# Patient Record
Sex: MALE | Race: WHITE | NOT HISPANIC OR LATINO | Employment: FULL TIME | URBAN - METROPOLITAN AREA
[De-identification: names, ages, dates, MRNs, and addresses within clinical notes are randomized per-mention and may not be internally consistent; named-entity substitution may affect disease eponyms.]

---

## 2018-07-04 LAB — RAPID GROUP A STREP (OHS): NEGATIVE

## 2018-12-19 LAB
INFLUENZA A ANTIGEN, POC: NEGATIVE
INFLUENZA B ANTIGEN, POC: NEGATIVE
RAPID GROUP A STREP (OHS): NEGATIVE

## 2019-09-23 ENCOUNTER — HISTORICAL (OUTPATIENT)
Dept: ADMINISTRATIVE | Facility: HOSPITAL | Age: 59
End: 2019-09-23

## 2019-09-23 LAB
ABS NEUT (OLG): 4.3 X10(3)/MCL (ref 2.1–9.2)
ALBUMIN SERPL-MCNC: 4.4 GM/DL (ref 3.4–5)
ALBUMIN/GLOB SERPL: 1.91 {RATIO} (ref 1.5–2.5)
ALP SERPL-CCNC: 49 UNIT/L (ref 38–126)
ALT SERPL-CCNC: 23 UNIT/L (ref 7–52)
APPEARANCE, UA: NORMAL
AST SERPL-CCNC: 24 UNIT/L (ref 15–37)
BACTERIA #/AREA URNS AUTO: NORMAL /HPF
BILIRUB SERPL-MCNC: 0.5 MG/DL (ref 0.2–1)
BILIRUB UR QL STRIP: NEGATIVE MG/DL
BILIRUBIN DIRECT+TOT PNL SERPL-MCNC: 0.1 MG/DL (ref 0–0.5)
BILIRUBIN DIRECT+TOT PNL SERPL-MCNC: 0.4 MG/DL
BUN SERPL-MCNC: 18 MG/DL (ref 7–18)
CALCIUM SERPL-MCNC: 9.4 MG/DL (ref 8.5–10)
CHLORIDE SERPL-SCNC: 103 MMOL/L (ref 98–107)
CHOLEST SERPL-MCNC: 181 MG/DL (ref 0–200)
CHOLEST/HDLC SERPL: 3.4 {RATIO}
CO2 SERPL-SCNC: 29 MMOL/L (ref 21–32)
COLOR UR: YELLOW
CREAT SERPL-MCNC: 1.09 MG/DL (ref 0.6–1.3)
ERYTHROCYTE [DISTWIDTH] IN BLOOD BY AUTOMATED COUNT: 12 % (ref 11.5–17)
GLOBULIN SER-MCNC: 2.2 GM/DL (ref 1.2–3)
GLUCOSE (UA): NEGATIVE MG/DL
GLUCOSE SERPL-MCNC: 109 MG/DL (ref 74–106)
HCT VFR BLD AUTO: 45.6 % (ref 42–52)
HDLC SERPL-MCNC: 54 MG/DL (ref 35–60)
HGB BLD-MCNC: 15.7 GM/DL (ref 14–18)
HGB UR QL STRIP: NEGATIVE UNIT/L
KETONES UR QL STRIP: NEGATIVE MG/DL
LDLC SERPL CALC-MCNC: 115 MG/DL (ref 0–129)
LEUKOCYTE ESTERASE UR QL STRIP: NEGATIVE UNIT/L
LYMPHOCYTES # BLD AUTO: 1.1 X10(3)/MCL (ref 0.6–3.4)
LYMPHOCYTES NFR BLD AUTO: 17.2 % (ref 13–40)
MCH RBC QN AUTO: 30.5 PG (ref 27–31.2)
MCHC RBC AUTO-ENTMCNC: 34 GM/DL (ref 32–36)
MCV RBC AUTO: 89 FL (ref 80–94)
MONOCYTES # BLD AUTO: 0.8 X10(3)/MCL (ref 0.1–1.3)
MONOCYTES NFR BLD AUTO: 12.6 % (ref 0.1–24)
NEUTROPHILS NFR BLD AUTO: 70.2 % (ref 47–80)
NITRITE UR QL STRIP.AUTO: NEGATIVE
PH UR STRIP: 7.5 [PH]
PLATELET # BLD AUTO: 182 X10(3)/MCL (ref 130–400)
PMV BLD AUTO: 10 FL (ref 9.4–12.4)
POTASSIUM SERPL-SCNC: 4.5 MMOL/L (ref 3.5–5.1)
PROT SERPL-MCNC: 6.7 GM/DL (ref 6.4–8.2)
PROT UR QL STRIP: NEGATIVE MG/DL
PSA SERPL-MCNC: 0 NG/ML (ref 0–3.5)
RBC # BLD AUTO: 5.14 X10(6)/MCL (ref 4.7–6.1)
RBC #/AREA URNS HPF: NORMAL /HPF
SODIUM SERPL-SCNC: 140 MMOL/L (ref 136–145)
SP GR UR STRIP: 1.01
SQUAMOUS EPITHELIAL, UA: NORMAL /LPF
TRIGL SERPL-MCNC: 111 MG/DL (ref 30–150)
UROBILINOGEN UR STRIP-ACNC: 0.2 MG/DL
VLDLC SERPL CALC-MCNC: 22.2 MG/DL
WBC # SPEC AUTO: 6.2 X10(3)/MCL (ref 4.5–11.5)
WBC #/AREA URNS AUTO: NORMAL /[HPF]

## 2019-09-24 LAB
EST. AVERAGE GLUCOSE BLD GHB EST-MCNC: 100 MG/DL
HBA1C MFR BLD: 5.1 % (ref 4.4–6.4)

## 2020-09-28 ENCOUNTER — HISTORICAL (OUTPATIENT)
Dept: ADMINISTRATIVE | Facility: HOSPITAL | Age: 60
End: 2020-09-28

## 2020-09-28 LAB
ABS NEUT (OLG): 3.6 X10(3)/MCL (ref 2.1–9.2)
ALBUMIN SERPL-MCNC: 4.4 GM/DL (ref 3.4–5)
ALBUMIN/GLOB SERPL: 1.83 {RATIO} (ref 1.5–2.5)
ALP SERPL-CCNC: 64 UNIT/L (ref 38–126)
ALT SERPL-CCNC: 24 UNIT/L (ref 7–52)
APPEARANCE, UA: CLEAR
AST SERPL-CCNC: 21 UNIT/L (ref 15–37)
BACTERIA #/AREA URNS AUTO: ABNORMAL /HPF
BILIRUB SERPL-MCNC: 0.7 MG/DL (ref 0.2–1)
BILIRUB UR QL STRIP: NEGATIVE MG/DL
BILIRUBIN DIRECT+TOT PNL SERPL-MCNC: 0.2 MG/DL (ref 0–0.5)
BILIRUBIN DIRECT+TOT PNL SERPL-MCNC: 0.5 MG/DL
BUN SERPL-MCNC: 18 MG/DL (ref 7–18)
CALCIUM SERPL-MCNC: 9.4 MG/DL (ref 8.5–10.1)
CHLORIDE SERPL-SCNC: 98 MMOL/L (ref 98–107)
CHOLEST SERPL-MCNC: 159 MG/DL (ref 0–200)
CHOLEST/HDLC SERPL: 2.8 {RATIO}
CO2 SERPL-SCNC: 31 MMOL/L (ref 21–32)
COLOR UR: YELLOW
CREAT SERPL-MCNC: 1.21 MG/DL (ref 0.6–1.3)
ERYTHROCYTE [DISTWIDTH] IN BLOOD BY AUTOMATED COUNT: 12.8 % (ref 11.5–17)
EST. AVERAGE GLUCOSE BLD GHB EST-MCNC: 97 MG/DL
GLOBULIN SER-MCNC: 2.4 GM/DL (ref 1.2–3)
GLUCOSE (UA): NEGATIVE MG/DL
GLUCOSE SERPL-MCNC: 107 MG/DL (ref 74–106)
HBA1C MFR BLD: 5 % (ref 4.4–6.4)
HCT VFR BLD AUTO: 46.3 % (ref 42–52)
HDLC SERPL-MCNC: 57 MG/DL (ref 35–60)
HGB BLD-MCNC: 15.7 GM/DL (ref 14–18)
HGB UR QL STRIP: ABNORMAL UNIT/L
KETONES UR QL STRIP: NEGATIVE MG/DL
LDLC SERPL CALC-MCNC: 90 MG/DL (ref 0–129)
LEUKOCYTE ESTERASE UR QL STRIP: NEGATIVE UNIT/L
LYMPHOCYTES # BLD AUTO: 1.1 X10(3)/MCL (ref 0.6–3.4)
LYMPHOCYTES NFR BLD AUTO: 20.8 % (ref 13–40)
MCH RBC QN AUTO: 30 PG (ref 27–31.2)
MCHC RBC AUTO-ENTMCNC: 34 GM/DL (ref 32–36)
MCV RBC AUTO: 88 FL (ref 80–94)
MONOCYTES # BLD AUTO: 0.6 X10(3)/MCL (ref 0.1–1.3)
MONOCYTES NFR BLD AUTO: 11.7 % (ref 0.1–24)
NEUTROPHILS NFR BLD AUTO: 67.5 % (ref 47–80)
NITRITE UR QL STRIP.AUTO: NEGATIVE
PH UR STRIP: 7 [PH]
PLATELET # BLD AUTO: 186 X10(3)/MCL (ref 130–400)
PMV BLD AUTO: 10.2 FL (ref 9.4–12.4)
POTASSIUM SERPL-SCNC: 4 MMOL/L (ref 3.5–5.1)
PROT SERPL-MCNC: 6.8 GM/DL (ref 6.4–8.2)
PROT UR QL STRIP: NEGATIVE MG/DL
PSA SERPL-MCNC: 0 NG/ML (ref 0–3.5)
RBC # BLD AUTO: 5.23 X10(6)/MCL (ref 4.7–6.1)
RBC #/AREA URNS HPF: ABNORMAL /HPF
SODIUM SERPL-SCNC: 140 MMOL/L (ref 136–145)
SP GR UR STRIP: 1.02
SQUAMOUS EPITHELIAL, UA: ABNORMAL /LPF
TRIGL SERPL-MCNC: 110 MG/DL (ref 30–150)
UROBILINOGEN UR STRIP-ACNC: 0.2 MG/DL
VLDLC SERPL CALC-MCNC: 22 MG/DL
WBC # SPEC AUTO: 5.3 X10(3)/MCL (ref 4.5–11.5)
WBC #/AREA URNS AUTO: ABNORMAL /[HPF]

## 2021-10-25 ENCOUNTER — HISTORICAL (OUTPATIENT)
Dept: ADMINISTRATIVE | Facility: HOSPITAL | Age: 61
End: 2021-10-25

## 2021-10-25 LAB
ABS NEUT (OLG): 4.7 X10(3)/MCL (ref 2.1–9.2)
ALBUMIN SERPL-MCNC: 4.6 GM/DL (ref 3.4–5)
ALBUMIN/GLOB SERPL: 1.64 {RATIO} (ref 1.5–2.5)
ALP SERPL-CCNC: 51 UNIT/L (ref 38–126)
ALT SERPL-CCNC: 23 UNIT/L (ref 7–52)
APPEARANCE, UA: CLEAR
AST SERPL-CCNC: 21 UNIT/L (ref 15–37)
BACTERIA #/AREA URNS AUTO: NORMAL /HPF
BILIRUB SERPL-MCNC: 0.6 MG/DL (ref 0.2–1)
BILIRUB UR QL STRIP: NEGATIVE MG/DL
BILIRUBIN DIRECT+TOT PNL SERPL-MCNC: 0.1 MG/DL (ref 0–0.5)
BILIRUBIN DIRECT+TOT PNL SERPL-MCNC: 0.5 MG/DL
BUN SERPL-MCNC: 21 MG/DL (ref 7–18)
CALCIUM SERPL-MCNC: 10.1 MG/DL (ref 8.5–10.1)
CHLORIDE SERPL-SCNC: 102 MMOL/L (ref 98–107)
CHOLEST SERPL-MCNC: 259 MG/DL (ref 0–200)
CHOLEST/HDLC SERPL: 6 {RATIO}
CO2 SERPL-SCNC: 30 MMOL/L (ref 21–32)
COLOR UR: YELLOW
CREAT SERPL-MCNC: 1.19 MG/DL (ref 0.6–1.3)
ERYTHROCYTE [DISTWIDTH] IN BLOOD BY AUTOMATED COUNT: 12.4 % (ref 11.5–17)
EST. AVERAGE GLUCOSE BLD GHB EST-MCNC: 97 MG/DL
GLOBULIN SER-MCNC: 2.8 GM/DL (ref 1.2–3)
GLUCOSE (UA): NEGATIVE MG/DL
GLUCOSE SERPL-MCNC: 111 MG/DL (ref 74–106)
HBA1C MFR BLD: 5 % (ref 4.4–6.4)
HCT VFR BLD AUTO: 47.7 % (ref 42–52)
HDLC SERPL-MCNC: 43 MG/DL (ref 35–60)
HGB BLD-MCNC: 15.9 GM/DL (ref 14–18)
HGB UR QL STRIP: NEGATIVE UNIT/L
KETONES UR QL STRIP: NEGATIVE MG/DL
LDLC SERPL CALC-MCNC: 165 MG/DL (ref 0–129)
LEUKOCYTE ESTERASE UR QL STRIP: NEGATIVE UNIT/L
LYMPHOCYTES # BLD AUTO: 1.2 X10(3)/MCL (ref 0.6–3.4)
LYMPHOCYTES NFR BLD AUTO: 17.6 % (ref 13–40)
MCH RBC QN AUTO: 30.5 PG (ref 27–31.2)
MCHC RBC AUTO-ENTMCNC: 33 GM/DL (ref 32–36)
MCV RBC AUTO: 91 FL (ref 80–94)
MONOCYTES # BLD AUTO: 0.7 X10(3)/MCL (ref 0.1–1.3)
MONOCYTES NFR BLD AUTO: 10.7 % (ref 0.1–24)
NEUTROPHILS NFR BLD AUTO: 71.7 % (ref 47–80)
NITRITE UR QL STRIP.AUTO: NEGATIVE
PH UR STRIP: 5.5 [PH]
PLATELET # BLD AUTO: 219 X10(3)/MCL (ref 130–400)
PMV BLD AUTO: 9.9 FL (ref 9.4–12.4)
POTASSIUM SERPL-SCNC: 4.5 MMOL/L (ref 3.5–5.1)
PROT SERPL-MCNC: 7.4 GM/DL (ref 6.4–8.2)
PROT UR QL STRIP: NEGATIVE MG/DL
PSA SERPL-MCNC: 0 NG/ML (ref 0–4.5)
RBC # BLD AUTO: 5.22 X10(6)/MCL (ref 4.7–6.1)
RBC #/AREA URNS HPF: NORMAL /HPF
SODIUM SERPL-SCNC: 139 MMOL/L (ref 136–145)
SP GR UR STRIP: 1.02
SQUAMOUS EPITHELIAL, UA: NORMAL /LPF
TRIGL SERPL-MCNC: 111 MG/DL (ref 30–150)
UROBILINOGEN UR STRIP-ACNC: 0.2 MG/DL
VLDLC SERPL CALC-MCNC: 22.2 MG/DL
WBC # SPEC AUTO: 6.6 X10(3)/MCL (ref 4.5–11.5)
WBC #/AREA URNS AUTO: NORMAL /[HPF]

## 2022-04-10 ENCOUNTER — HISTORICAL (OUTPATIENT)
Dept: ADMINISTRATIVE | Facility: HOSPITAL | Age: 62
End: 2022-04-10

## 2022-04-24 VITALS
SYSTOLIC BLOOD PRESSURE: 118 MMHG | OXYGEN SATURATION: 97 % | HEIGHT: 74 IN | DIASTOLIC BLOOD PRESSURE: 70 MMHG | BODY MASS INDEX: 30.38 KG/M2 | WEIGHT: 236.75 LBS

## 2022-05-03 NOTE — HISTORICAL [facility]ER
This is a historical note converted from [employer]. Formatting and pictures may have been removed.  Please reference Penny for original formatting and attached multimedia. Chief Complaint  ANNUAL WELLNESS-NPO  History of Present Illness  Pt presents for Wellness exam.  He has been feeling well.  Sleep: he sleeps well with Trazodone.  Appetite is normal; he tends to eat more in the evening.  He exercises few?times a week intermittently.  No tobacco.  He has 2 children and 8 grandchildren.  He has a few drinks monthly.  He has 1 cup of coffee daily.  He is not working at this time, he is now retired.  Colonoscopy 2011: Dr Stevenson, repeat in 10 years.  Uro: Bjorn BOYLE--h/o prostate cancer and is now following PRN.  Review of Systems  GENERAL: No weight loss, no?weight gain, no fever, no fatigue, no chills, no night sweats  HEENT: No sore throat, no ear pain, no sinus pressure, no nasal congestion, no rhinorrhea, no decreased hearing, +seasonal allergies?  VISION: No vision changes, no blurry vision, no double vision, no glaucoma, no cataracts, no glasses, no contacts  LAST EYE EXAM: several years ago  NECK: no LAD  CARDIAC: No chest pain, no palpitations, no dyspnea on exertion, no orthopnea  RESPIRATORY: No cough, no wheezing, no sputum production, no?SOB  GI: No abdominal pain, no N/V, no constipation, no diarrhea, no blood in stool,?( -)?family history of Colon Ca  : No dysuria, no hematuria, no frequency, no urgency, no incontinence, no testicular pain/swelling, (+ ) family history of Prostate Ca--father, grandfather, self  MUSC/SKEL: No myalgia, no weakness, no edema, no arthralgia, no joint swelling/effusions  SKIN: No rashes, no hives, no itching, no sores  NEURO: No HA, no numbness, no tingling, no weakness, no dizziness  PSYCH: No anxiety, no depression, no?irritability, no panic attacks,?no s/i, no h/i, no?hallucinations  ENDO: No polyuria, no polyphagia,? no polydipsia  HEME: No bruising, no bleeding  disorders, no signs of anemia.?  Physical Exam  Vitals & Measurements  T:?36.8? ?C (Oral)? HR:?74(Peripheral)? BP:?128/80? SpO2:?95%?  HT:?182.88?cm? HT:?182.88?cm? WT:?111.000?kg? WT:?111.0?kg? BMI:?33.19?  General: Well developed, well nourished in no apparent distress, alert and oriented x3  Skin:?No rash or abnormal?lesions  HEENT:?Normocephalic, PERRLA, EOMI, mouth WNL, throat WNL, nares normal, EAC and TM WNL bilaterally  Neck:?FROM, no LAD, no thyroid abnormalities?palpable  Chest:?CTA?bilaterally, no wheezes crackles or rubs  Cardiac: RRR,?no murmurs, rubs, gallops  Abdomen: Soft, nontender,?nondistended, NBSx4, no rebound tenderness or guarding, no HSM  Extremities:?No clubbing, cyanosis, or edema.? Joints WNL, +2 DP/PT pulses bilaterally  Neuro: No sensory or motor defects noted. CN II-XII intact. Gait WNL.  Genital: declined--given risks.  Assessment/Plan  1.?Wellness examination?Z00.00  CBC, CMP, Lipids, UA, PSA?ordered today.?  Colonoscopy 2011: Dr Stevenson, repeat in 10 years.  Encourage pt to increase cardiovascular exercise and attempt to obtain at least 150 minutes of moderate aerobic exercise per week or 75 minutes of vigorous aerobic exercise weekly.  Ordered:  CBC w/ Auto Diff, Routine collect, 09/28/20 9:13:00 CDT, Blood, Order for future visit, Stop date 09/28/20 9:13:00 CDT, Lab Collect, Wellness examination  Hypertension  Hypercholesteremia  Depression  Insomnia  History of prostate cancer, 09/28/20 9:13:00 CDT  Clinic Follow-Up Wellness, *Est. 09/28/21 3:00:00 CDT, Order for future visit, Wellness examination  Hypertension  Hypercholesteremia  Depression  Insomnia  History of prostate cancer  Immunization due, [facility]  Comprehensive Metabolic Panel, Routine collect, 09/28/20 9:13:00 CDT, Blood, Order for future visit, Stop date 09/28/20 9:13:00 CDT, Lab Collect, Wellness examination  Hypertension  Hypercholesteremia  Depression  Insomnia  History of prostate cancer, 09/28/20  9:13:00 CDT  Lab Collection Request, 09/28/20 9:13:00 CDT, [facility] AMB - AFP, 09/28/20 9:13:00 CDT, Wellness examination  Hypertension  Hypercholesteremia  Depression  Insomnia  History of prostate cancer  Lipid Panel, Routine collect, 09/28/20 9:13:00 CDT, Blood, Order for future visit, Stop date 09/28/20 9:13:00 CDT, Lab Collect, Wellness examination  Hypercholesteremia  Hypertension, 09/28/20 9:13:00 CDT  Preventative Health Care Est 40-64 years [id number] PC, Wellness examination  Hypertension  Hypercholesteremia  Depression  Insomnia  History of prostate cancer  Immunization due, [facility] AMB - AFP, 09/28/20 9:13:00 CDT  Prostate Specific Antigen, Routine collect, 09/28/20 9:13:00 CDT, Blood, Order for future visit, Stop date 09/28/20 9:13:00 CDT, Lab Collect, Wellness examination  History of prostate cancer, 09/28/20 9:13:00 CDT  Urinalysis no Reflex, Routine collect, Urine, Order for future visit, 09/28/20 9:13:00 CDT, Stop date 09/28/20 9:13:00 CDT, Nurse collect, Wellness examination  Hypertension  ?  2.?Hypertension?I10  Stable and well controlled at this time. Continue current treatment. Limit salt in diet. Monitor BP at home.  Ordered:  CBC w/ Auto Diff, Routine collect, 09/28/20 9:13:00 CDT, Blood, Order for future visit, Stop date 09/28/20 9:13:00 CDT, Lab Collect, Wellness examination  Hypertension  Hypercholesteremia  Depression  Insomnia  History of prostate cancer, 09/28/20 9:13:00 CDT  Clinic Follow-Up Wellness, *Est. 09/28/21 3:00:00 CDT, Order for future visit, Wellness examination  Hypertension  Hypercholesteremia  Depression  Insomnia  History of prostate cancer  Immunization due, [facility]  Comprehensive Metabolic Panel, Routine collect, 09/28/20 9:13:00 CDT, Blood, Order for future visit, Stop date 09/28/20 9:13:00 CDT, Lab Collect, Wellness examination  Hypertension  Hypercholesteremia  Depression  Insomnia  History of prostate cancer, 09/28/20 9:13:00 CDT  Lab  Collection Request, 09/28/20 9:13:00 CDT, [facility] AMB - AFP, 09/28/20 9:13:00 CDT, Wellness examination  Hypertension  Hypercholesteremia  Depression  Insomnia  History of prostate cancer  Lipid Panel, Routine collect, 09/28/20 9:13:00 CDT, Blood, Order for future visit, Stop date 09/28/20 9:13:00 CDT, Lab Collect, Wellness examination  Hypercholesteremia  Hypertension, 09/28/20 9:13:00 CDT  Preventative Health Care Est 40-64 years [id number] PC, Wellness examination  Hypertension  Hypercholesteremia  Depression  Insomnia  History of prostate cancer  Immunization due, [facility] AMB - AFP, 09/28/20 9:13:00 CDT  Urinalysis no Reflex, Routine collect, Urine, Order for future visit, 09/28/20 9:13:00 CDT, Stop date 09/28/20 9:13:00 CDT, Nurse collect, Wellness examination  Hypertension  ?  3.?Hypercholesteremia?E78.00  Continue current medicine. ?Follow low-cholesterol diet with?physical activity.??  Ordered:  CBC w/ Auto Diff, Routine collect, 09/28/20 9:13:00 CDT, Blood, Order for future visit, Stop date 09/28/20 9:13:00 CDT, Lab Collect, Wellness examination  Hypertension  Hypercholesteremia  Depression  Insomnia  History of prostate cancer, 09/28/20 9:13:00 CDT  Clinic Follow-Up Wellness, *Est. 09/28/21 3:00:00 CDT, Order for future visit, Wellness examination  Hypertension  Hypercholesteremia  Depression  Insomnia  History of prostate cancer  Immunization due, [facility]  Comprehensive Metabolic Panel, Routine collect, 09/28/20 9:13:00 CDT, Blood, Order for future visit, Stop date 09/28/20 9:13:00 CDT, Lab Collect, Wellness examination  Hypertension  Hypercholesteremia  Depression  Insomnia  History of prostate cancer, 09/28/20 9:13:00 CDT  Lab Collection Request, 09/28/20 9:13:00 CDT, [facility] AMB - AFP, 09/28/20 9:13:00 CDT, Wellness examination  Hypertension  Hypercholesteremia  Depression  Insomnia  History of prostate cancer  Lipid Panel, Routine collect, 09/28/20 9:13:00 CDT, Blood, Order  for future visit, Stop date 09/28/20 9:13:00 CDT, Lab Collect, Wellness examination  Hypercholesteremia  Hypertension, 09/28/20 9:13:00 CDT  Preventative Health Care Est 40-64 years [id number] PC, Wellness examination  Hypertension  Hypercholesteremia  Depression  Insomnia  History of prostate cancer  Immunization due, [facility] AMB - AFP, 09/28/20 9:13:00 CDT  ?  4.?Depression?F32.9  Stable/well controlled with current treatment. Will continue to monitor? Risks/benefits/side effects of medication?and alternatives discussed with patient in great detail?who voiced clear understanding.? ED precautions per suicidal ideation,?homicidal ideation,?self-harm?and verbalized understanding.? If concerns or issues arise after office hours,?call 911 or report to emergency room; patient verbalized understanding.? Informed patient to not stop medication without informing office and medical staff.?  Ordered:  CBC w/ Auto Diff, Routine collect, 09/28/20 9:13:00 CDT, Blood, Order for future visit, Stop date 09/28/20 9:13:00 CDT, Lab Collect, Wellness examination  Hypertension  Hypercholesteremia  Depression  Insomnia  History of prostate cancer, 09/28/20 9:13:00 CDT  Clinic Follow-Up Wellness, *Est. 09/28/21 3:00:00 CDT, Order for future visit, Wellness examination  Hypertension  Hypercholesteremia  Depression  Insomnia  History of prostate cancer  Immunization due, [facility]  Comprehensive Metabolic Panel, Routine collect, 09/28/20 9:13:00 CDT, Blood, Order for future visit, Stop date 09/28/20 9:13:00 CDT, Lab Collect, Wellness examination  Hypertension  Hypercholesteremia  Depression  Insomnia  History of prostate cancer, 09/28/20 9:13:00 CDT  Lab Collection Request, 09/28/20 9:13:00 CDT, [facility] AMB - AFP, 09/28/20 9:13:00 CDT, Wellness examination  Hypertension  Hypercholesteremia  Depression  Insomnia  History of prostate cancer  Preventative Health Care Est 40-64 years [id number] PC, Wellness examination   Hypertension  Hypercholesteremia  Depression  Insomnia  History of prostate cancer  Immunization due, [facility] AMB - AFP, 09/28/20 9:13:00 CDT  ?  5.?Insomnia?G47.00  Stable/well controlled with current treatment. Will continue to monitor.?  Ordered:  CBC w/ Auto Diff, Routine collect, 09/28/20 9:13:00 CDT, Blood, Order for future visit, Stop date 09/28/20 9:13:00 CDT, Lab Collect, Wellness examination  Hypertension  Hypercholesteremia  Depression  Insomnia  History of prostate cancer, 09/28/20 9:13:00 CDT  Clinic Follow-Up Wellness, *Est. 09/28/21 3:00:00 CDT, Order for future visit, Wellness examination  Hypertension  Hypercholesteremia  Depression  Insomnia  History of prostate cancer  Immunization due, [facility] AFP  Comprehensive Metabolic Panel, Routine collect, 09/28/20 9:13:00 CDT, Blood, Order for future visit, Stop date 09/28/20 9:13:00 CDT, Lab Collect, Wellness examination  Hypertension  Hypercholesteremia  Depression  Insomnia  History of prostate cancer, 09/28/20 9:13:00 CDT  Lab Collection Request, 09/28/20 9:13:00 CDT, [facility] AMB - AFP, 09/28/20 9:13:00 CDT, Wellness examination  Hypertension  Hypercholesteremia  Depression  Insomnia  History of prostate cancer  Preventative Health Care Est 40-64 years [id number] PC, Wellness examination  Hypertension  Hypercholesteremia  Depression  Insomnia  History of prostate cancer  Immunization due, [facility] AMB - AFP, 09/28/20 9:13:00 CDT  ?  6.?History of prostate cancer?Z85.46  S/p prostatectomy 2012; PSA today.  Ordered:  CBC w/ Auto Diff, Routine collect, 09/28/20 9:13:00 CDT, Blood, Order for future visit, Stop date 09/28/20 9:13:00 CDT, Lab Collect, Wellness examination  Hypertension  Hypercholesteremia  Depression  Insomnia  History of prostate cancer, 09/28/20 9:13:00 CDT  Clinic Follow-Up Wellness, *Est. 09/28/21 3:00:00 CDT, Order for future visit, Wellness examination  Hypertension  Hypercholesteremia  Depression  Insomnia   History of prostate cancer  Immunization due, [facility]  Comprehensive Metabolic Panel, Routine collect, 09/28/20 9:13:00 CDT, Blood, Order for future visit, Stop date 09/28/20 9:13:00 CDT, Lab Collect, Wellness examination  Hypertension  Hypercholesteremia  Depression  Insomnia  History of prostate cancer, 09/28/20 9:13:00 CDT  Lab Collection Request, 09/28/20 9:13:00 CDT, [facility] AMB - AFP, 09/28/20 9:13:00 CDT, Wellness examination  Hypertension  Hypercholesteremia  Depression  Insomnia  History of prostate cancer  Preventative Health Care Est 40-64 years [id number] PC, Wellness examination  Hypertension  Hypercholesteremia  Depression  Insomnia  History of prostate cancer  Immunization due, [facility] AMB - AFP, 09/28/20 9:13:00 CDT  Prostate Specific Antigen, Routine collect, 09/28/20 9:13:00 CDT, Blood, Order for future visit, Stop date 09/28/20 9:13:00 CDT, Lab Collect, Wellness examination  History of prostate cancer, 09/28/20 9:13:00 CDT  ?  7.?Immunization due?Z23  Flu 0.5ml admin today--risks/benefits discussed with patient.?  Ordered:  Influenza Virus Vaccine, Inactivated, 0.5 mL, IM, Once, first dose 09/28/20 9:14:00 CDT, stop date 09/28/20 9:14:00 CDT  Clinic Follow-Up Wellness, *Est. 09/28/21 3:00:00 CDT, Order for future visit, Wellness examination  Hypertension  Hypercholesteremia  Depression  Insomnia  History of prostate cancer  Immunization due, [facility] Care Est 40-64 years [id number] PC, Wellness examination  Hypertension  Hypercholesteremia  Depression  Insomnia  History of prostate cancer  Immunization due, [facility] AMB - AFP, 09/28/20 9:13:00 CDT  ?  Orders:  amlodipine-benazepril, 1 cap(s), Oral, Daily, # 90 cap(s), 3 Refill(s)  atorvastatin, 20 mg = 1 tab(s), Oral, Daily, # 90 tab(s), 1 Refill(s)  buPROPion, See Instructions, TAKE 1 TABLET BY MOUTH EVERY DAY, # 90 tab(s), 3 Refill(s)  sildenafil, 20 mg = 1 tab(s), Oral, TID, # 270 tab(s), 1 Refill(s), Weight  Dosing  traZODone, See Instructions, TAKE 1 TABLET BY MOUTH ONCE DAILY IN THE EVENING., # 90 tab(s), 3 Refill(s)  venlafaxine, See Instructions, TAKE 1 CAPSULE BY MOUTH EVERY DAY, # 90 cap(s), 3 Refill(s)  Referrals  Clinic Follow-Up Wellness, *Est. 09/28/21 3:00:00 CDT, Order for future visit, Wellness examination  Hypertension  Hypercholesteremia  Depression  Insomnia  History of prostate cancer  Immunization due, [facility] AFP   Problem List/Past Medical History  Ongoing  Dependent edema  Depression  History of prostate cancer  Hypercholesteremia  Hypertension  Insomnia  Obesity  Historical  Acid reflux  CA - Cancer  Hypertension Diastolic(  Confirmed  )  Leg pain  Sinusitis  Procedure/Surgical History  CLOSURE OF SKIN AND SUBCUTANEOUS TISSUE OTHER SITES (05/05/2013)  Simple repair of superficial wounds of scalp, neck, axillae, external genitalia, trunk and/or extremities (including hands and feet); 2.5 cm or less. (05/05/2013)  Ablation, soft tissue of inferior turbinates, unilateral or bilateral, any method (eg, electrocautery, radiofrequency ablation, or tissue volume reduction); intramural (ie, submucosal). (12/10/2012)  Ethmoidectomy (12/10/2012)  Fracture nasal inferior turbinate(s), therapeutic. (12/10/2012)  Fracture of the turbinates (12/10/2012)  Intranasal antrotomy (12/10/2012)  Nasal/sinus endoscopy, surgical, with maxillary antrostomy;. (12/10/2012)  Nasal/sinus endoscopy, surgical, with sphenoidotomy; with removal of tissue from the sphenoid sinus. (12/10/2012)  Nasal/sinus endoscopy, surgical; with olvin bullosa resection. (12/10/2012)  Nasal/sinus endoscopy, surgical; with ethmoidectomy, total (anterior and posterior). (12/10/2012)  Other septoplasty (12/10/2012)  Other turbinectomy (12/10/2012)  Septoplasty or submucous resection, with or without cartilage scoring, contouring or replacement with graft. (12/10/2012)  Sphenoidectomy (12/10/2012)  Turbinectomy by diathermy or cryosurgery  (12/10/2012)  Laparoscopic robotic assisted procedure (09/26/2012)  Radical prostatectomy (09/26/2012)  Biopsy of prostate (08/09/2012)  Colonoscopy (10/24/2011)  Esophagogastroduodenoscopy (10/24/2011)  Skin graft (1967)   Medications  amlodipine-benazepril 10 mg-40 mg oral capsule, 1 cap(s), Oral, Daily, 3 refills  atorvastatin 20 mg oral tablet, 20 mg= 1 tab(s), Oral, Daily, 1 refills  buPROPion 300 mg/24 hours (XL) oral tablet, extended release, See Instructions, 3 refills  EpiPen Auto-Injector, 0.3 mg, IM, As Directed, PRN  Influenza Virus Vaccine, Inactivated, 0.5 mL, IM, Once  sildenafil 20 mg oral tablet, 20 mg= 1 tab(s), Oral, TID, 1 refills  traZODone 100 mg oral tablet, See Instructions, 3 refills  venlafaxine 150 mg oral capsule, extended release, See Instructions, 3 refills  Allergies  Milk Products?(bloating)  Wheat?(bloating)  Social History  Abuse/Neglect  No, 09/28/2020  No, 09/23/2019  No, 06/14/2019  Alcohol - Low Risk, 12/07/2012  Current, Beer, Liquor, 1-2 times per month, 09/28/2020  Current, Beer, 3-5 times per week, 12/07/2012  Current, Beer, 1-2 times per week, 3 drinks/episode average., 09/24/2012  Employment/School  Retired, 09/28/2020  Employed, Highest education level: University degree(s)., 12/07/2012  Exercise  Home/Environment  Lives with Siblings., 09/28/2020  Nutrition/Health  Regular, Good, 09/28/2020  Substance Use - Denies Substance Abuse, 12/07/2012  Never, 09/28/2020  Tobacco - Denies Tobacco Use, 12/07/2012  Never (less than 100 in lifetime), N/A, 09/28/2020  Never (less than 100 in lifetime), N/A, 09/23/2019  Never (less than 100 in lifetime), N/A, 06/14/2019  Never smoker, N/A, 12/19/2018  Never smoker Use:., 07/04/2018  Family History  Diabetes mellitus: Father.  Hyperlipidemia.: Mother and Father.  Hypertension: Mother, Father and Brother.  Obesity: Mother and Brother.  Immunizations  Vaccine Date Status   influenza virus vaccine, inactivated 09/23/2019 Given   influenza  virus vaccine, inactivated 01/03/2018 Recorded   influenza virus vaccine, inactivated 09/27/2016 Recorded   tetanus/diphtheria/pertussis, acel(Tdap) 09/27/2016 Recorded   influenza virus vaccine, inactivated 10/12/2015 Recorded   tetanus-diphtheria toxoids 05/05/2013 Given   Health Maintenance  Health Maintenance  ???Pending?(in the next year)  ??? ??OverDue  ??? ? ? ?Aspirin Therapy for CVD Prevention due??04/05/13??and every 1??year(s)  ??? ? ? ?Hypertension Maintenance-Medication Prescribed due??12/07/13??and every 1??year(s)  ??? ? ? ?Alcohol Misuse Screening due??01/02/20??and every 1??year(s)  ??? ? ? ?Hypertension Management-BMP due??09/22/20??and every 1??year(s)  ??? ??Due?  ??? ? ? ?ADL Screening due??09/28/20??and every 1??year(s)  ??? ? ? ?Hypertension Management-Education due??09/28/20??and every 1??year(s)  ??? ? ? ?Influenza Vaccine due??09/28/20??and every?  ??? ? ? ?Zoster Vaccine due??09/28/20??and every?  ??? ??Due In Future?  ??? ? ? ?Obesity Screening not due until??01/01/21??and every 1??year(s)  ???Satisfied?(in the past 1 year)  ??? ??Satisfied?  ??? ? ? ?Blood Pressure Screening on??09/28/20.??Satisfied by Levi Artis LPN  ??? ? ? ?Body Mass Index Check on??09/28/20.??Satisfied by Levi Artis LPN  ??? ? ? ?Hypertension Management-Blood Pressure on??09/28/20.??Satisfied by Levi Artis LPN  ??? ? ? ?Influenza Vaccine on??09/28/20.??Satisfied by Rosalinda Carmen NP  ??? ? ? ?Obesity Screening on??09/28/20.??Satisfied by Levi Artis LPN  ?      Patients condition discussed the development nurse practitioner.?  I have reviewed and agree with plan of care and follow-up.

## 2022-09-21 ENCOUNTER — HISTORICAL (OUTPATIENT)
Dept: ADMINISTRATIVE | Facility: HOSPITAL | Age: 62
End: 2022-09-21

## 2022-09-21 PROBLEM — I10 HYPERTENSION: Status: ACTIVE | Noted: 2022-09-21

## 2022-09-21 PROBLEM — F32.A DEPRESSION: Status: ACTIVE | Noted: 2022-09-21

## 2022-09-21 PROBLEM — E78.00 HYPERCHOLESTEREMIA: Status: ACTIVE | Noted: 2022-09-21

## 2022-09-21 PROBLEM — G47.00 INSOMNIA: Status: ACTIVE | Noted: 2022-09-21

## 2022-09-21 PROBLEM — K21.9 ACID REFLUX: Status: ACTIVE | Noted: 2022-09-21

## 2022-09-21 PROBLEM — C61 PROSTATE CANCER: Status: ACTIVE | Noted: 2022-09-21

## 2022-09-21 PROBLEM — E16.2 HYPOGLYCEMIA: Status: ACTIVE | Noted: 2022-09-21

## 2022-09-21 PROBLEM — J32.9 SINUSITIS: Status: ACTIVE | Noted: 2022-09-21

## 2022-10-02 PROBLEM — Z00.00 ENCOUNTER FOR WELLNESS EXAMINATION IN ADULT: Status: ACTIVE | Noted: 2022-10-02

## 2022-10-04 PROBLEM — Z12.11 COLON CANCER SCREENING: Status: ACTIVE | Noted: 2022-10-04

## 2022-10-04 PROBLEM — Z23 IMMUNIZATION DUE: Status: ACTIVE | Noted: 2022-10-04

## 2022-12-01 DIAGNOSIS — K21.9 GASTROESOPHAGEAL REFLUX DISEASE WITHOUT ESOPHAGITIS: Primary | ICD-10-CM

## 2023-01-02 PROBLEM — Z00.00 ENCOUNTER FOR WELLNESS EXAMINATION IN ADULT: Status: RESOLVED | Noted: 2022-10-02 | Resolved: 2023-01-02

## 2023-10-17 PROBLEM — Z00.00 WELLNESS EXAMINATION: Status: ACTIVE | Noted: 2022-10-04

## 2023-12-23 ENCOUNTER — OFFICE VISIT (OUTPATIENT)
Dept: URGENT CARE | Facility: CLINIC | Age: 63
End: 2023-12-23
Payer: COMMERCIAL

## 2023-12-23 VITALS
OXYGEN SATURATION: 95 % | HEIGHT: 74 IN | WEIGHT: 240 LBS | TEMPERATURE: 98 F | RESPIRATION RATE: 20 BRPM | SYSTOLIC BLOOD PRESSURE: 151 MMHG | HEART RATE: 80 BPM | BODY MASS INDEX: 30.8 KG/M2 | DIASTOLIC BLOOD PRESSURE: 95 MMHG

## 2023-12-23 DIAGNOSIS — J00 NASOPHARYNGITIS: Primary | ICD-10-CM

## 2023-12-23 LAB
CTP QC/QA: YES
CTP QC/QA: YES
POC MOLECULAR INFLUENZA A AGN: NEGATIVE
POC MOLECULAR INFLUENZA B AGN: NEGATIVE
SARS-COV-2 RDRP RESP QL NAA+PROBE: NEGATIVE

## 2023-12-23 PROCEDURE — 87635: ICD-10-PCS | Mod: QW,,, | Performed by: FAMILY MEDICINE

## 2023-12-23 PROCEDURE — 87502 INFLUENZA DNA AMP PROBE: CPT | Mod: QW,,, | Performed by: FAMILY MEDICINE

## 2023-12-23 PROCEDURE — 87502 POCT INFLUENZA A/B MOLECULAR: ICD-10-PCS | Mod: QW,,, | Performed by: FAMILY MEDICINE

## 2023-12-23 PROCEDURE — 99213 PR OFFICE/OUTPT VISIT, EST, LEVL III, 20-29 MIN: ICD-10-PCS | Mod: ,,, | Performed by: FAMILY MEDICINE

## 2023-12-23 PROCEDURE — 99213 OFFICE O/P EST LOW 20 MIN: CPT | Mod: ,,, | Performed by: FAMILY MEDICINE

## 2023-12-23 PROCEDURE — 87635 SARS-COV-2 COVID-19 AMP PRB: CPT | Mod: QW,,, | Performed by: FAMILY MEDICINE

## 2023-12-23 RX ORDER — PROMETHAZINE HYDROCHLORIDE AND DEXTROMETHORPHAN HYDROBROMIDE 6.25; 15 MG/5ML; MG/5ML
5 SYRUP ORAL EVERY 4 HOURS PRN
Qty: 120 ML | Refills: 0 | Status: SHIPPED | OUTPATIENT
Start: 2023-12-23 | End: 2023-12-27

## 2023-12-23 RX ORDER — PREDNISONE 20 MG/1
40 TABLET ORAL DAILY
Qty: 8 TABLET | Refills: 0 | Status: SHIPPED | OUTPATIENT
Start: 2023-12-23 | End: 2023-12-27

## 2023-12-23 RX ORDER — BENZONATATE 200 MG/1
200 CAPSULE ORAL 3 TIMES DAILY PRN
Qty: 15 CAPSULE | Refills: 0 | Status: SHIPPED | OUTPATIENT
Start: 2023-12-23 | End: 2023-12-28

## 2023-12-23 RX ORDER — AMOXICILLIN AND CLAVULANATE POTASSIUM 875; 125 MG/1; MG/1
1 TABLET, FILM COATED ORAL EVERY 12 HOURS
Qty: 14 TABLET | Refills: 0 | Status: SHIPPED | OUTPATIENT
Start: 2023-12-23 | End: 2023-12-30

## 2023-12-23 NOTE — PROGRESS NOTES
Patient ID: [id number]     Chief Complaint: upper respiratory tract infection symptoms    History of Present Illness:     Kris Emerson is a 63 y.o. male with a history of hypertension, depression, prostate cancer, who presents today for symptoms of Chest Congestion (Pt presents to clinic with c/o chest congestion, productive cough, nasal drip, sore throat, fatigue, nausea starting two weeks ago. Otc meds ineffective. No testing )  He specifically requested steroids and antibiotics    Pt denies experiencing any fevers, chills, nausea, vomiting, difficulty breathing, dysphagia, or neck stiffness.    Past Medical History:     ----------------------------  Acid reflux  Depression  Hypercholesteremia  Hypertension  Hypoglycemia  Insomnia  Prostate cancer     Past Surgical History:   Procedure Laterality Date    CLOSURE OF SKIN AND SUBCUTANEOUS TISSUE OTHER SITES   05/05/2013    COLONOSCOPY  10/24/2011    ESOPHAGOGASTRODUODENOSCOPY  10/24/2011    ETHMOIDECTOMY  12/10/2012    Hx of removal of cyst       Nasal/sinus endoscopy, surgical, with sphenoidotomy; with removal of tissue from the sphenoid sinus  12/10/2012    RADICAL PROSTATECTOMY  09/26/2012    SKIN GRAFT  1967       Review of patient's allergies indicates:  No Known Allergies    Outpatient Medications Marked as Taking for the 12/23/23 encounter (Office Visit) with Se Olvera MD   Medication Sig Dispense Refill    amLODIPine-benazepriL (LOTREL) 10-40 mg per capsule Take 1 capsule by mouth once daily. 90 capsule 3    buPROPion (WELLBUTRIN XL) 300 MG 24 hr tablet ZARIA 1 TABLET BY MOUTH EVERY DAY 90 tablet 3    traZODone (DESYREL) 100 MG tablet TAKE ONE TABLET BY MOUTH IN THE EVENING 90 tablet 3    venlafaxine (EFFEXOR-XR) 150 MG Cp24 TAKE 1 CAPSULE BY MOUTH EVERY DAY 90 capsule 0       Social History     Socioeconomic History    Marital status:     Number of children: 2   Occupational History    Occupation: retired   Tobacco Use    Smoking status:  Never    Smokeless tobacco: Never   Substance and Sexual Activity    Alcohol use: Yes     Comment: 1-2 times a week    Drug use: Never        Family History   Problem Relation Age of Onset    Hyperlipidemia Mother     Hypertension Mother     Obesity Mother     Diabetes type II Father     Hyperlipidemia Father     Hypertension Father     COPD Sister     Obesity Sister     Adrenal disorder Sister     Hypertension Brother     Obesity Brother         Subjective:     Review of Systems   Constitutional:  Positive for malaise/fatigue. Negative for chills and fever.   HENT:  Positive for sore throat. Negative for congestion, ear discharge, ear pain and sinus pain.    Respiratory:  Positive for cough. Negative for sputum production, shortness of breath, wheezing and stridor.    Gastrointestinal:  Positive for nausea. Negative for abdominal pain, diarrhea and vomiting.   Genitourinary:  Negative for dysuria, frequency and urgency.   Musculoskeletal:  Negative for neck pain.   Skin:  Negative for rash.   Neurological:  Negative for headaches.       Objective:     Vitals:    12/23/23 1518   BP: (!) 151/95   Pulse:    Resp:    Temp:      Body mass index is 30.81 kg/m².    Physical Exam  Constitutional:       General: He is not in acute distress.     Appearance: Normal appearance. He is normal weight. He is not ill-appearing or toxic-appearing.   HENT:      Head: Normocephalic and atraumatic.      Right Ear: Tympanic membrane and ear canal normal.      Left Ear: Tympanic membrane and ear canal normal.      Nose: No congestion or rhinorrhea.      Mouth/Throat:      Pharynx: Oropharynx is clear. No oropharyngeal exudate or posterior oropharyngeal erythema.   Eyes:      General:         Right eye: No discharge.         Left eye: No discharge.      Extraocular Movements: Extraocular movements intact.      Conjunctiva/sclera: Conjunctivae normal.   Cardiovascular:      Rate and Rhythm: Normal rate and regular rhythm.      Heart  sounds: Normal heart sounds. No murmur heard.     No friction rub. No gallop.   Pulmonary:      Effort: Pulmonary effort is normal. No respiratory distress.      Breath sounds: Normal breath sounds. No stridor. No wheezing, rhonchi or rales.   Chest:      Chest wall: No tenderness.   Musculoskeletal:      Cervical back: No rigidity or tenderness.   Lymphadenopathy:      Cervical: No cervical adenopathy.   Skin:     Coloration: Skin is not pale.   Neurological:      Mental Status: He is alert and oriented to person, place, and time. Mental status is at baseline.   Psychiatric:         Mood and Affect: Mood normal.         Behavior: Behavior normal.         Assessment & Plan:       ICD-10-CM ICD-9-CM   1. Nasopharyngitis  J00 460        1. Nasopharyngitis  -     POCT COVID-19 Rapid Screening  -     POCT Influenza A/B Molecular    Other orders  -     promethazine-dextromethorphan (PROMETHAZINE-DM) 6.25-15 mg/5 mL Syrp; Take 5 mLs by mouth every 4 (four) hours as needed.  Dispense: 120 mL; Refill: 0  -     benzonatate (TESSALON) 200 MG capsule; Take 1 capsule (200 mg total) by mouth 3 (three) times daily as needed for Cough.  Dispense: 15 capsule; Refill: 0  -     amoxicillin-clavulanate 875-125mg (AUGMENTIN) 875-125 mg per tablet; Take 1 tablet by mouth every 12 (twelve) hours. for 7 days  Dispense: 14 tablet; Refill: 0  -     predniSONE (DELTASONE) 20 MG tablet; Take 2 tablets (40 mg total) by mouth once daily. for 4 days  Dispense: 8 tablet; Refill: 0         Influenza and COVID negative.  He qualifies for antibiotics for acute bacterial sinusitis since his symptoms have gone on for longer than 10 days, pursuant to Infectious Disease society of [place] guidelines.  Augmentin is first-line.  Otherwise, we talked about symptoms, likely diagnoses and management. We discussed that pt likely has a viral upper respiratory infection that will resolve on its own within 1-2 weeks, and that only symptomatic treatment is  indicated at this time. We discussed warning signs and symptoms to monitor for and to seek medical care if they emerge. Pt will return  if symptoms change, worsen, or do not resolved within the expected time range.

## 2023-12-23 NOTE — PATIENT INSTRUCTIONS
Please take promethazine cough syrup and Tessalon Perles as needed for cough.  Please note that promethazine cough syrup can induce drowsiness.  Some patients prefer to take it only at night.    Augmentin twice daily for 7 days    Prednisone once daily for up to 4 days    Drink plenty of fluids.      Get plenty of rest.      Tylenol or Motrin as needed.      Go to the ER with any significant change or worsening of symptoms.

## 2024-01-22 PROBLEM — Z00.00 WELLNESS EXAMINATION: Status: RESOLVED | Noted: 2022-10-04 | Resolved: 2024-01-22

## 2024-05-09 ENCOUNTER — OFFICE VISIT (OUTPATIENT)
Dept: URGENT CARE | Facility: CLINIC | Age: 64
End: 2024-05-09
Payer: COMMERCIAL

## 2024-05-09 VITALS
HEIGHT: 72 IN | HEART RATE: 80 BPM | BODY MASS INDEX: 35.21 KG/M2 | WEIGHT: 260 LBS | RESPIRATION RATE: 18 BRPM | OXYGEN SATURATION: 98 % | TEMPERATURE: 99 F | SYSTOLIC BLOOD PRESSURE: 139 MMHG | DIASTOLIC BLOOD PRESSURE: 79 MMHG

## 2024-05-09 DIAGNOSIS — L08.9 SKIN INFECTION: Primary | ICD-10-CM

## 2024-05-09 DIAGNOSIS — J06.9 URI WITH COUGH AND CONGESTION: ICD-10-CM

## 2024-05-09 PROCEDURE — 99213 OFFICE O/P EST LOW 20 MIN: CPT | Mod: ,,,

## 2024-05-09 RX ORDER — MUPIROCIN 20 MG/G
OINTMENT TOPICAL 3 TIMES DAILY
Qty: 1 EACH | Refills: 1 | Status: SHIPPED | OUTPATIENT
Start: 2024-05-09 | End: 2024-05-19

## 2024-05-09 RX ORDER — CEPHALEXIN 500 MG/1
500 CAPSULE ORAL EVERY 8 HOURS
Qty: 21 CAPSULE | Refills: 0 | Status: SHIPPED | OUTPATIENT
Start: 2024-05-09 | End: 2024-05-16

## 2024-05-09 RX ORDER — PREDNISONE 20 MG/1
TABLET ORAL
Qty: 8 TABLET | Refills: 0 | Status: SHIPPED | OUTPATIENT
Start: 2024-05-09

## 2024-05-09 NOTE — PROGRESS NOTES
Subjective:      Patient ID: Kris Emerson is a 64 y.o. male.    Vitals:  height is 6' (1.829 m) and weight is 117.9 kg (260 lb). His oral temperature is 99.1 °F (37.3 °C). His blood pressure is 139/79 and his pulse is 80. His respiration is 18 and oxygen saturation is 98%.     Chief Complaint: Nasal Congestion     Patient is a 64 y.o. male who presents to urgent care with multiple complaints.  First complaint is of skin rash, he reports history of staph infections in the past, believes he got these at the gym.  Notes rash, redness noted to the right posterior calf, there is also few rash areas in between his left toes.  Also complaining  of cough, nasal congestion x2 days.  He reports mild sore throat with postnasal drip.  weeks. Alleviating factors include Claritinn, Nasacort with mild amount of relief. Also concerned about skin infection on left foot and right leg. Patient denies fever, headache, neck stiffness, GI symptoms.  Denies any purulent drainage from the wounds.  Does report honey crusting, clear and serous fluid.      Skin:  Positive for erythema.      Objective:     Physical Exam   Constitutional: He is oriented to person, place, and time. He appears well-developed.   HENT:   Head: Normocephalic and atraumatic. Head is without abrasion, without contusion and without laceration.   Ears:   Right Ear: Tympanic membrane, external ear and ear canal normal.   Left Ear: Tympanic membrane, external ear and ear canal normal.   Nose: Congestion present.   Mouth/Throat: Mucous membranes are normal. Mucous membranes are moist. Posterior oropharyngeal erythema present. No oropharyngeal exudate.   Eyes: Conjunctivae, EOM and lids are normal. Pupils are equal, round, and reactive to light.   Neck: Trachea normal and phonation normal. Neck supple.   Cardiovascular: Normal rate, regular rhythm and normal heart sounds.   Pulmonary/Chest: Effort normal and breath sounds normal. No stridor. No respiratory distress. He  has no wheezes. He has no rales.   Musculoskeletal: Normal range of motion.         General: Normal range of motion.   Lymphadenopathy:     He has no cervical adenopathy.   Neurological: He is alert and oriented to person, place, and time.   Skin: Skin is warm, dry, intact and rash. Capillary refill takes less than 2 seconds. erythema No abrasion, No burn, No bruising and No ecchymosis         Comments: A few scattered raised lesions to the right posterior calf, serous fluid, honey crusting noted, no surrounding erythema, induration, purulence noted.  Few areas of erythema, maceration noted between left toes, moist, no purulent drainage noted   Psychiatric: His speech is normal and behavior is normal. Judgment and thought content normal.   Nursing note and vitals reviewed.      Assessment:     1. Skin infection    2. URI with cough and congestion        Plan:       Skin infection  -     mupirocin (BACTROBAN) 2 % ointment; Apply topically 3 (three) times daily. for 10 days  Dispense: 1 each; Refill: 1  -     cephALEXin (KEFLEX) 500 MG capsule; Take 1 capsule (500 mg total) by mouth every 8 (eight) hours. for 7 days  Dispense: 21 capsule; Refill: 0  -     predniSONE (DELTASONE) 20 MG tablet; One tablet orally twice daily for 3 days and then once daily for 2 days  Dispense: 8 tablet; Refill: 0    URI with cough and congestion  -     predniSONE (DELTASONE) 20 MG tablet; One tablet orally twice daily for 3 days and then once daily for 2 days  Dispense: 8 tablet; Refill: 0              As there is no purulent drainage or fluctuance, will cover with Keflex.  Also discussed possible fungal etiology due to the moist area in between the toes, he is using white over-the-counter ointment for this area, however patient reports this is historically treated with antibiotics.    Take antibiotics and steroids with food.  Wound Care: Twice daily wound care as discussed.   Pain: Take OTC Tylenol or Ibuprofen per package instructions  as needed for pain.    Drink plenty of fluids. Get plenty of rest.   Claritin, Zyrtec, or other over-the-counter antihistamine for runny nose, postnasal drip, nasal congestion.  Nasal spray such as Nasacort or Flonase for congestion.  Over-the-counter cough medication as needed and as directed.  Over-the-counter decongestants such as Sudafed, phenylephrine or pseudoephedrine.  Avoid these if you have a history of high blood pressure.  Warm saltwater gargles for sore throat.  Warm water with honey to help coat the throat.  Throat lozenges.  Chloraseptic spray for worsening sore throat.      Call or return to clinic as needed   Go to the ER with any significant change or worsening of symptoms.   Follow up with your primary care doctor.

## 2024-05-21 ENCOUNTER — TELEPHONE (OUTPATIENT)
Dept: URGENT CARE | Facility: CLINIC | Age: 64
End: 2024-05-21

## 2024-05-21 ENCOUNTER — OFFICE VISIT (OUTPATIENT)
Dept: URGENT CARE | Facility: CLINIC | Age: 64
End: 2024-05-21
Payer: COMMERCIAL

## 2024-05-21 VITALS
RESPIRATION RATE: 18 BRPM | TEMPERATURE: 98 F | OXYGEN SATURATION: 98 % | BODY MASS INDEX: 35.21 KG/M2 | SYSTOLIC BLOOD PRESSURE: 135 MMHG | HEART RATE: 72 BPM | DIASTOLIC BLOOD PRESSURE: 87 MMHG | WEIGHT: 260 LBS | HEIGHT: 72 IN

## 2024-05-21 DIAGNOSIS — B35.4 TINEA CORPORIS: Primary | ICD-10-CM

## 2024-05-21 DIAGNOSIS — B35.3 TINEA PEDIS OF LEFT FOOT: ICD-10-CM

## 2024-05-21 DIAGNOSIS — L08.9 SOFT TISSUE INFECTION: ICD-10-CM

## 2024-05-21 PROCEDURE — 99203 OFFICE O/P NEW LOW 30 MIN: CPT | Mod: ,,, | Performed by: NURSE PRACTITIONER

## 2024-05-21 RX ORDER — SULFAMETHOXAZOLE AND TRIMETHOPRIM 800; 160 MG/1; MG/1
1 TABLET ORAL 2 TIMES DAILY
Qty: 20 TABLET | Refills: 0 | Status: SHIPPED | OUTPATIENT
Start: 2024-05-21 | End: 2024-05-31

## 2024-05-21 NOTE — PROGRESS NOTES
Subjective:      Patient ID: Kris Emerson is a 64 y.o. male.    Vitals:  height is 6' (1.829 m) and weight is 117.9 kg (260 lb). His temperature is 98.4 °F (36.9 °C). His blood pressure is 135/87 and his pulse is 72. His respiration is 18 and oxygen saturation is 98%.     Chief Complaint: Other     Patient is a 64 y.o. male who presents to urgent care with complaints of possible skin infection on right lower leg not resolved from 5/9/24 visit. Alleviating factors include Keflex completed with moderate amount of relief.  Also itching and redness to several toes on the left foot.  Onset several days ago          Skin:  Positive for rash and erythema (RLE posterior calf, inbetween 2 and 3rd toe of the lft foot).      Objective:     Physical Exam   Abdominal: Normal appearance.   Neurological: He is alert.   Skin: Skin is warm, dry and rash (3 x 3 cm area of erythema round in shape scaly erythemas with ointment on skin). erythema (RLE posterior calf, inbetween 2 and 3rd toe of the lft foot)        Vitals reviewed.      Assessment:     1. Tinea corporis    2. Tinea pedis of left foot    3. Soft tissue infection        Plan:   Apply topical prescription Lamisil as directed to right lower leg as instructed  Spray OTC Tinactin in between toes on the left foot as instructed  Take prescription Bactrim as directed  Follow-up with your primary care provider or return here for concerns  Go to ER for worsening redness, swelling, pain, or for concerns    Tinea corporis  -     terbinafine HCL (LAMISIL AT) 1 % cream; Apply topically 2 (two) times daily.  Dispense: 15 g; Refill: 0    Tinea pedis of left foot    Soft tissue infection  -     sulfamethoxazole-trimethoprim 800-160mg (BACTRIM DS) 800-160 mg Tab; Take 1 tablet by mouth 2 (two) times daily. for 10 days  Dispense: 20 tablet; Refill: 0

## 2024-05-21 NOTE — PATIENT INSTRUCTIONS
Apply topical prescription Lamisil as directed to right lower leg as instructed  Spray OTC Tinactin in between toes on the left foot as instructed  Take prescription Bactrim as directed  Follow-up with your primary care provider or return here for concerns  Go to ER for worsening redness, swelling, pain, or for concerns